# Patient Record
(demographics unavailable — no encounter records)

---

## 2024-11-12 NOTE — HISTORY OF PRESENT ILLNESS
[FreeTextEntry1] : Patient is a 53 yo female here today for sharp pain at umbilical incision site exacerbated by movement. Pt is s/p LESS LS BS 5/23/2025

## 2024-11-12 NOTE — PLAN
[FreeTextEntry1] : exam benign obtain CT of abdomen & pelvis r/o hernia will call with results AQA pt verbalized understanding   I Mary Medley [facility] am scribing for the presence of Dr. Ramirez the following sections HISTORY OF PRESENT ILLNESS, PAST MEDICAL/FAMILY/SOCIAL HISTORY; REVIEW OF SYSTEMS; VITAL SIGNS; PHYSICAL EXAM; DISPOSITION. I personally performed the services described in the documentation, reviewed the documentation recorded by the scribe in my presence and it accurately and completely records my words and actions.

## 2024-11-12 NOTE — PHYSICAL EXAM
[Chaperone Present] : A chaperone was present in the examining room during all aspects of the physical examination [36627] : A chaperone was present during the pelvic exam. [Appropriately responsive] : appropriately responsive [Alert] : alert [No Acute Distress] : no acute distress [No Lymphadenopathy] : no lymphadenopathy [Soft] : soft [Non-tender] : non-tender [Non-distended] : non-distended [No HSM] : No HSM [No Lesions] : no lesions [No Mass] : no mass [Oriented x3] : oriented x3 [FreeTextEntry2] : Balaji FNP-BC [FreeTextEntry7] : no hernia felt

## 2024-11-12 NOTE — PHYSICAL EXAM
[Chaperone Present] : A chaperone was present in the examining room during all aspects of the physical examination [78528] : A chaperone was present during the pelvic exam. [Appropriately responsive] : appropriately responsive [Alert] : alert [No Acute Distress] : no acute distress [No Lymphadenopathy] : no lymphadenopathy [Soft] : soft [Non-tender] : non-tender [Non-distended] : non-distended [No HSM] : No HSM [No Lesions] : no lesions [No Mass] : no mass [Oriented x3] : oriented x3 [FreeTextEntry2] : Balaji FNP-BC [FreeTextEntry7] : no hernia felt

## 2024-11-12 NOTE — PLAN
[FreeTextEntry1] : exam benign obtain CT of abdomen & pelvis r/o hernia will call with results AQA pt verbalized understanding   I Mary Medley [facility] am scribing for the presence of Dr. Ramirez the following sections HISTORY OF PRESENT ILLNESS, PAST MEDICAL/FAMILY/SOCIAL HISTORY; REVIEW OF SYSTEMS; VITAL SIGNS; PHYSICAL EXAM; DISPOSITION. I personally performed the services described in the documentation, reviewed the documentation recorded by the scribe in my presence and it accurately and completely records my words and actions.

## 2025-01-23 NOTE — HISTORY OF PRESENT ILLNESS
[FreeTextEntry1] : Patient had left breast biopsy performed in 2023 at [facility] and reported as calcification. Patient reports pain and itchiness to the left breast for 2 weeks. Denies palpable mass, redness on the skin, nipple discharge. Last mammogram performed in 2023 at [facility]. Maternal grandmother at age 70 has breast cancer.  Patient had partial hysterectomy in May 2024 with Dr. Ramirez at [facility].

## 2025-01-23 NOTE — PHYSICAL EXAM
[JVD] : no jugular venous distention  [Normal Breath Sounds] : Normal breath sounds [Normal Heart Sounds] : normal heart sounds [Abdomen Tenderness] : ~T ~M Abdominal tenderness [No HSM] : no hepatosplenomegaly [No Rash or Lesion] : No rash or lesion [Alert] : alert [Oriented to Person] : oriented to person [Oriented to Place] : oriented to place [Oriented to Time] : oriented to time [Calm] : calm [de-identified] : NAD [de-identified] : NC/AT PER [de-identified] : soft, + umbilical hernia. No groin hernia on PE. [de-identified] : no CVA tenderness [de-identified] : moves all 4 extr 5/5

## 2025-01-23 NOTE — ASSESSMENT
[FreeTextEntry1] : History for left breast mass  Patient assisted in scheduling follow-up mammogram and breast ultrasound with radiologist  The area of concern appears to be a stable fibroadenoma  A total of 30 minutes was spent in consultation evaluation review

## 2025-01-23 NOTE — CONSULT LETTER
[Dear  ___] : Dear  [unfilled], [Consult Letter:] : I had the pleasure of evaluating your patient, [unfilled]. [Please see my note below.] : Please see my note below. [Consult Closing:] : Thank you very much for allowing me to participate in the care of this patient.  If you have any questions, please do not hesitate to contact me. [Sincerely,] : Sincerely, [FreeTextEntry3] : Wm Albert SÁNCHEZ

## 2025-01-23 NOTE — PROCEDURE
[FreeTextEntry3] : Ultrasound left breast  The patient has a history for fibroadenoma upper outer left breast  No significant interval changes noted  There is a stable 1 cm hypoechoic nodule in the upper outer left breast  BI-RADS 2

## 2025-01-23 NOTE — HISTORY OF PRESENT ILLNESS
[de-identified] : 53 yo female with an inc hernia after GYN lap surgery. Minimal discomfort. No GI obstruction. Here for repair.